# Patient Record
Sex: FEMALE
[De-identification: names, ages, dates, MRNs, and addresses within clinical notes are randomized per-mention and may not be internally consistent; named-entity substitution may affect disease eponyms.]

---

## 2018-08-23 ENCOUNTER — HOSPITAL ENCOUNTER (OUTPATIENT)
Dept: HOSPITAL 5 - CT | Age: 73
Discharge: HOME | End: 2018-08-23
Attending: HOSPITALIST
Payer: MEDICARE

## 2018-08-23 DIAGNOSIS — K86.2: Primary | ICD-10-CM

## 2018-08-23 DIAGNOSIS — K57.90: ICD-10-CM

## 2018-08-23 LAB — BUN SERPL-MCNC: 12 MG/DL (ref 7–17)

## 2018-08-23 PROCEDURE — 84520 ASSAY OF UREA NITROGEN: CPT

## 2018-08-23 PROCEDURE — 36415 COLL VENOUS BLD VENIPUNCTURE: CPT

## 2018-08-23 PROCEDURE — 74170 CT ABD WO CNTRST FLWD CNTRST: CPT

## 2018-08-23 PROCEDURE — 82565 ASSAY OF CREATININE: CPT

## 2018-08-23 NOTE — CAT SCAN REPORT
CT ABDOMEN WITH AND WITHOUT CONTRAST:



HISTORY:  Pancreatic lesion.



COMPARISON: None at this facility.



TECHNIQUE:  Helical CT in 1.25mm intervals following IV contrast. 

Sagittal and coronal reconstructions. 





FINDINGS:



Lung bases: Normal.



Liver: Normal.



Biliary system: Normal.



Pancreas: A 1.6 cm cyst is identified in the body of the pancreas. The 

remainder of the pancreatic tissue is within normal limits. No 

suspicious mass, inflammatory changes or ductal dilatation.



Spleen: Normal.



Kidneys/ureters/bladder: Normal.



Adrenal glands: Normal.



Aorta: Normal.



Intestines: The visualized bowel loops are normal caliber and wall 

thickness. There is mild fecal retention in the colon and a few 

scattered diverticula in the visualized ascending colon.



Appendix: Not identified, correlate with surgical history.



Ascites: None.



Adenopathy: None.



Musculoskeletal: Moderate lumbar spondylosis. No evidence for fracture 

or suspicious bony lesion.





IMPRESSION:

1.6 cm pancreatic cyst as described.

Mild diverticulosis of the colon.